# Patient Record
(demographics unavailable — no encounter records)

---

## 2025-05-06 NOTE — PHYSICAL EXAM
[Alert] : alert [Well Nourished] : well nourished [Healthy Appearance] : healthy appearance [No Acute Distress] : no acute distress [Well Developed] : well developed [Normal Sclera/Conjunctiva] : normal sclera/conjunctiva [EOMI] : extra ocular movement intact [No Proptosis] : no proptosis [Normal Outer Ear/Nose] : the ears and nose were normal in appearance [Normal Lips/Gums] : the lips and gums were normal [Normal Oropharynx] : the oropharynx was normal [Thyroid Not Enlarged] : the thyroid was not enlarged [No Respiratory Distress] : no respiratory distress [No Accessory Muscle Use] : no accessory muscle use [Clear to Auscultation] : lungs were clear to auscultation bilaterally [Normal S1, S2] : normal S1 and S2 [Normal Rate] : heart rate was normal [Regular Rhythm] : with a regular rhythm [No Edema] : no peripheral edema [Normal Bowel Sounds] : normal bowel sounds [Not Tender] : non-tender [Not Distended] : not distended [Soft] : abdomen soft [Normal Anterior Cervical Nodes] : no anterior cervical lymphadenopathy [No Spinal Tenderness] : no spinal tenderness [Spine Straight] : spine straight [No Stigmata of Cushings Syndrome] : no stigmata of Cushings Syndrome [Normal Gait] : normal gait [Normal Strength/Tone] : muscle strength and tone were normal [No Tremors] : no tremors [Oriented x3] : oriented to person, place, and time [Normal Affect] : the affect was normal [Normal Mood] : the mood was normal [Kyphosis] : no kyphosis present [Scoliosis] : no scoliosis [de-identified] : corrective lenses

## 2025-05-06 NOTE — HISTORY OF PRESENT ILLNESS
[FreeTextEntry1] :  Pt returns for a follow-up visit for osteoporosis. Pt had first dose of IV Reclast 10/2023.We elected to delay next dose to 18 months. No interval health changes. No major surgeries, hospitalizations, fractures or changes in medication. Up to date with dentist. No major dental work planned. On vitamin D 50,000 units every other week. States she had screening BMD q2-3 years. Patient has been told of low bone density in the distant past (osteopenia). I do not have the bone density data available from that time. Last BMD showed osteoporosis so pt was referred here by Dr. Nik Haas ([facility]).  Bone Mineral Density: 08/15/2023  Indication: vs prior ([id number]?) Spine (L1, L2, L3, L4): -0.4, normal Total hip: -1.6, osteopenia Femoral neck: -2.4, osteopenia Proximal radius: -0.8, normal  Patient is post-menopausal since age 50. Denies HRT.   Pt has no Hx of kidney stones or calcium issues. No Hx of anorexia nervosa, premature menopause, amenorrhea during reproductive years. No h/o celiac disease, malabsorption, nutritional deficiencies. . No ulcers or bleeding. No other unusual risks for osteoporosis such as FHx hip fracture, suggestion of OI. No Hx of RT. No chronic prednisone use. No Hx of Paget's disease. No Hx of DVT or PE. Denies EtOH use, excessive soda intake, unusual dietary restrictions. Up to date with routine care.   Activity includes walking and pt takes weightlifting, aerobic and [employer] classes.  H/o fx to L knee 15-20 years ago 2/2 fall s/p repair with pin. R elbow fx 12 years ago 2/2 fall, healed to secondary intention with cast.  L or R ankle fx 5 years ago 2/2 slip and fall, healed to secondary intention with boot.  Former smoker since age 20, 6 cigarettes a day, quit 5 years ago.   PMHx: HTN, HLD. Denies CVD, Paget's Dz, hyperparathyroidism, diabetes and chronic steroid use. Meds: [id number] mg, atorvastatin 20 mg. PSHx: s/p tonsillectomy. Denies hip surgery and thoracic or lumbar vertebroplasty.  Sees DDS every 3 months. Dental work includes 5 caps replaced.    Up to date with routine care. Labs from Dr. Byrnes ([facility]).

## 2025-05-06 NOTE — HISTORY OF PRESENT ILLNESS
[FreeTextEntry1] :  Pt returns for a follow-up visit for osteoporosis. Pt had first dose of IV Reclast 10/2023.We elected to delay next dose to 18 months. No interval health changes. No major surgeries, hospitalizations, fractures or changes in medication. Up to date with dentist. No major dental work planned. On vitamin D 50,000 units every other week. States she had screening BMD q2-3 years. Patient has been told of low bone density in the distant past (osteopenia). I do not have the bone density data available from that time. Last BMD showed osteoporosis so pt was referred here by Dr. Nik Haas ([facility]).  Bone Mineral Density: 08/15/2023  Indication: vs prior ([id number]?) Spine (L1, L2, L3, L4): -0.4, normal Total hip: -1.6, osteopenia Femoral neck: -2.4, osteopenia Proximal radius: -0.8, normal  Patient is post-menopausal since age 50. Denies HRT.   Pt has no Hx of kidney stones or calcium issues. No Hx of anorexia nervosa, premature menopause, amenorrhea during reproductive years. No h/o celiac disease, malabsorption, nutritional deficiencies. . No ulcers or bleeding. No other unusual risks for osteoporosis such as FHx hip fracture, suggestion of OI. No Hx of RT. No chronic prednisone use. No Hx of Paget's disease. No Hx of DVT or PE. Denies EtOH use, excessive soda intake, unusual dietary restrictions. Up to date with routine care.   Activity includes walking and pt takes weightlifting, aerobic and [employer] classes.  H/o fx to L knee 15-20 years ago 2/2 fall s/p repair with pin. R elbow fx 12 years ago 2/2 fall, healed to secondary intention with cast.  L or R ankle fx 5 years ago 2/2 slip and fall, healed to secondary intention with boot.  Former smoker since age 20, 6 cigarettes a day, quit 5 years ago.   PMHx: HTN, HLD. Denies CVD, Paget's Dz, hyperparathyroidism, diabetes and chronic steroid use. Meds: [id number] mg, atorvastatin 20 mg. PSHx: s/p tonsillectomy. Denies hip surgery and thoracic or lumbar vertebroplasty.  Sees DDS every 3 months. Dental work includes 5 caps replaced.    Up to date with routine care. Labs from Dr. Byrnes ([facility]).

## 2025-05-06 NOTE — PHYSICAL EXAM
[Alert] : alert [Well Nourished] : well nourished [Healthy Appearance] : healthy appearance [No Acute Distress] : no acute distress [Well Developed] : well developed [Normal Sclera/Conjunctiva] : normal sclera/conjunctiva [EOMI] : extra ocular movement intact [No Proptosis] : no proptosis [Normal Outer Ear/Nose] : the ears and nose were normal in appearance [Normal Lips/Gums] : the lips and gums were normal [Normal Oropharynx] : the oropharynx was normal [Thyroid Not Enlarged] : the thyroid was not enlarged [No Respiratory Distress] : no respiratory distress [No Accessory Muscle Use] : no accessory muscle use [Clear to Auscultation] : lungs were clear to auscultation bilaterally [Normal S1, S2] : normal S1 and S2 [Normal Rate] : heart rate was normal [Regular Rhythm] : with a regular rhythm [No Edema] : no peripheral edema [Normal Bowel Sounds] : normal bowel sounds [Not Tender] : non-tender [Not Distended] : not distended [Soft] : abdomen soft [Normal Anterior Cervical Nodes] : no anterior cervical lymphadenopathy [No Spinal Tenderness] : no spinal tenderness [Spine Straight] : spine straight [No Stigmata of Cushings Syndrome] : no stigmata of Cushings Syndrome [Normal Gait] : normal gait [Normal Strength/Tone] : muscle strength and tone were normal [No Tremors] : no tremors [Oriented x3] : oriented to person, place, and time [Normal Affect] : the affect was normal [Normal Mood] : the mood was normal [Kyphosis] : no kyphosis present [Scoliosis] : no scoliosis [de-identified] : corrective lenses

## 2025-05-06 NOTE — ASSESSMENT
[FreeTextEntry1] : 75y/o female returns for a follow-up visit for osteoporosis.  States she had screening BMD q2-3 years and has been told of low bone density in the distant past (osteopenia). I do not have the bone density data available from that time. Last BMD showed osteoporosis so pt was referred here by Dr. Nik Haas ([facility]). BMD 08/15/2023 shows Indication: vs prior ([id number]?. Spine (L1, L2, L3, L4): -0.4, normal. Total hip: -1.6, osteopenia. Femoral neck: -2.4, osteopenia. Proximal radius: -0.8, normal.   . Pt had first dose of IV Reclast 10/2023. BMD 08/2024 indicates stable osteopenia in total hip, fem neck and prox. radius. BMD results reviewed w/ pt. Since BMD is overall stable, recommended delaying next dose of IV Reclast18-month dosing consistent with Dario et al. Fracture prevention with zoledronate in older women with osteopenia. NEJM 2018.   Referred to rheumatology for second dose of intravenous Reclast.  Consent signed.  f/u one year

## 2025-05-06 NOTE — ASSESSMENT
[FreeTextEntry1] : 77y/o female returns for a follow-up visit for osteoporosis.  States she had screening BMD q2-3 years and has been told of low bone density in the distant past (osteopenia). I do not have the bone density data available from that time. Last BMD showed osteoporosis so pt was referred here by Dr. Nik Haas ([facility]). BMD 08/15/2023 shows Indication: vs prior ([id number]?. Spine (L1, L2, L3, L4): -0.4, normal. Total hip: -1.6, osteopenia. Femoral neck: -2.4, osteopenia. Proximal radius: -0.8, normal.   . Pt had first dose of IV Reclast 10/2023. BMD 08/2024 indicates stable osteopenia in total hip, fem neck and prox. radius. BMD results reviewed w/ pt. Since BMD is overall stable, recommended delaying next dose of IV Reclast18-month dosing consistent with Dario et al. Fracture prevention with zoledronate in older women with osteopenia. NEJM 2018.   Referred to rheumatology for second dose of intravenous Reclast.  Consent signed.  f/u one year

## 2025-05-28 NOTE — HISTORY OF PRESENT ILLNESS
[N/A] : N/A [Denies] : Denies [No] : No [Yes] : Yes [Informed consent documented in EHR.] : Informed consent documented in EHR. [Left upper extremity] : Left upper extremity [24g] : 24g [Start Time: ___] : Medication Start Time: [unfilled] [End Time: ___] : Medication End Time: [unfilled] [Medication Name: ___] : Medication Name: [unfilled] [Total Amount Administered: ___] : Total Amount Administered: [unfilled] [IV discontinued. Intact. No signs or symptoms of IV complications noted. Time: ___] : IV discontinued. Intact. No signs or symptoms of IV complications noted. Time: [unfilled] [Patient  instructed to seek medical attention with signs and symptoms of adverse effects] : Patient  instructed to seek medical attention with signs and symptoms of adverse effects [Patient left unit in no acute distress] : Patient left unit in no acute distress [Medications administered as ordered and tolerated well.] : Medications administered as ordered and tolerated well. [de-identified] : median cubital vein  [de-identified] : Patient presents for Zoledronic Acid infusion, doing well overall. Patient given discharge instructions, verbalized understanding and tolerated infusion well.